# Patient Record
Sex: FEMALE | Race: WHITE
[De-identification: names, ages, dates, MRNs, and addresses within clinical notes are randomized per-mention and may not be internally consistent; named-entity substitution may affect disease eponyms.]

---

## 2019-12-30 ENCOUNTER — HOSPITAL ENCOUNTER (INPATIENT)
Dept: HOSPITAL 62 - LR | Age: 34
LOS: 2 days | Discharge: HOME | End: 2020-01-01
Attending: OBSTETRICS & GYNECOLOGY | Admitting: OBSTETRICS & GYNECOLOGY
Payer: COMMERCIAL

## 2019-12-30 DIAGNOSIS — Z3A.35: ICD-10-CM

## 2019-12-30 DIAGNOSIS — Z79.899: ICD-10-CM

## 2019-12-30 DIAGNOSIS — F17.210: ICD-10-CM

## 2019-12-30 DIAGNOSIS — F15.90: ICD-10-CM

## 2019-12-30 DIAGNOSIS — Z88.0: ICD-10-CM

## 2019-12-30 DIAGNOSIS — F12.10: ICD-10-CM

## 2019-12-30 DIAGNOSIS — A74.9: ICD-10-CM

## 2019-12-30 DIAGNOSIS — F14.10: ICD-10-CM

## 2019-12-30 LAB
ADD MANUAL DIFF: NO
APPEARANCE UR: (no result)
APTT PPP: YELLOW S
BARBITURATES UR QL SCN: NEGATIVE
BASOPHILS # BLD AUTO: 0.1 10^3/UL (ref 0–0.2)
BASOPHILS NFR BLD AUTO: 0.6 % (ref 0–2)
BILIRUB UR QL STRIP: NEGATIVE
CHLAM PCR: DETECTED
EOSINOPHIL # BLD AUTO: 0.1 10^3/UL (ref 0–0.6)
EOSINOPHIL NFR BLD AUTO: 0.5 % (ref 0–6)
ERYTHROCYTE [DISTWIDTH] IN BLOOD BY AUTOMATED COUNT: 20.7 % (ref 11.5–14)
ERYTHROCYTE [DISTWIDTH] IN BLOOD BY AUTOMATED COUNT: 21 % (ref 11.5–14)
GLUCOSE UR STRIP-MCNC: NEGATIVE MG/DL
HCT VFR BLD CALC: 28.1 % (ref 36–47)
HCT VFR BLD CALC: 28.8 % (ref 36–47)
HGB BLD-MCNC: 8.7 G/DL (ref 12–15.5)
HGB BLD-MCNC: 9 G/DL (ref 12–15.5)
KETONES UR STRIP-MCNC: NEGATIVE MG/DL
LYMPHOCYTES # BLD AUTO: 2.6 10^3/UL (ref 0.5–4.7)
LYMPHOCYTES NFR BLD AUTO: 15.3 % (ref 13–45)
MCH RBC QN AUTO: 20.7 PG (ref 27–33.4)
MCH RBC QN AUTO: 21 PG (ref 27–33.4)
MCHC RBC AUTO-ENTMCNC: 31 G/DL (ref 32–36)
MCHC RBC AUTO-ENTMCNC: 31.3 G/DL (ref 32–36)
MCV RBC AUTO: 67 FL (ref 80–97)
MCV RBC AUTO: 67 FL (ref 80–97)
METHADONE UR QL SCN: (no result)
MONOCYTES # BLD AUTO: 0.8 10^3/UL (ref 0.1–1.4)
MONOCYTES NFR BLD AUTO: 4.8 % (ref 3–13)
NEUTROPHILS # BLD AUTO: 13.6 10^3/UL (ref 1.7–8.2)
NEUTS SEG NFR BLD AUTO: 78.8 % (ref 42–78)
NITRITE UR QL STRIP: NEGATIVE
PCP UR QL SCN: NEGATIVE
PH UR STRIP: 8 [PH] (ref 5–9)
PLATELET # BLD: 423 10^3/UL (ref 150–450)
PLATELET # BLD: 467 10^3/UL (ref 150–450)
PROT UR STRIP-MCNC: 30 MG/DL
RBC # BLD AUTO: 4.2 10^6/UL (ref 3.72–5.28)
RBC # BLD AUTO: 4.3 10^6/UL (ref 3.72–5.28)
SP GR UR STRIP: 1.01
TOTAL CELLS COUNTED % (AUTO): 100 %
URINE AMPHETAMINES SCREEN: NEGATIVE
URINE BENZODIAZEPINES SCREEN: NEGATIVE
URINE COCAINE SCREEN: (no result)
URINE MARIJUANA (THC) SCREEN: (no result)
UROBILINOGEN UR-MCNC: 2 MG/DL (ref ?–2)
WBC # BLD AUTO: 17.2 10^3/UL (ref 4–10.5)
WBC # BLD AUTO: 17.2 10^3/UL (ref 4–10.5)

## 2019-12-30 PROCEDURE — 81005 URINALYSIS: CPT

## 2019-12-30 PROCEDURE — G0480 DRUG TEST DEF 1-7 CLASSES: HCPCS

## 2019-12-30 PROCEDURE — 86803 HEPATITIS C AB TEST: CPT

## 2019-12-30 PROCEDURE — 80349 CANNABINOIDS NATURAL: CPT

## 2019-12-30 PROCEDURE — 86804 HEP C AB TEST CONFIRM: CPT

## 2019-12-30 PROCEDURE — 36415 COLL VENOUS BLD VENIPUNCTURE: CPT

## 2019-12-30 PROCEDURE — 86701 HIV-1ANTIBODY: CPT

## 2019-12-30 PROCEDURE — 86901 BLOOD TYPING SEROLOGIC RH(D): CPT

## 2019-12-30 PROCEDURE — 80307 DRUG TEST PRSMV CHEM ANLYZR: CPT

## 2019-12-30 PROCEDURE — 88307 TISSUE EXAM BY PATHOLOGIST: CPT

## 2019-12-30 PROCEDURE — 87491 CHLMYD TRACH DNA AMP PROBE: CPT

## 2019-12-30 PROCEDURE — 86592 SYPHILIS TEST NON-TREP QUAL: CPT

## 2019-12-30 PROCEDURE — 86900 BLOOD TYPING SEROLOGIC ABO: CPT

## 2019-12-30 PROCEDURE — 86850 RBC ANTIBODY SCREEN: CPT

## 2019-12-30 PROCEDURE — 87591 N.GONORRHOEAE DNA AMP PROB: CPT

## 2019-12-30 PROCEDURE — 80353 DRUG SCREENING COCAINE: CPT

## 2019-12-30 PROCEDURE — 87340 HEPATITIS B SURFACE AG IA: CPT

## 2019-12-30 PROCEDURE — 85025 COMPLETE CBC W/AUTO DIFF WBC: CPT

## 2019-12-30 RX ADMIN — DOCUSATE SODIUM SCH MG: 100 CAPSULE, LIQUID FILLED ORAL at 17:29

## 2019-12-30 RX ADMIN — FERROUS SULFATE TAB 325 MG (65 MG ELEMENTAL FE) SCH MG: 325 (65 FE) TAB at 10:11

## 2019-12-30 RX ADMIN — IBUPROFEN SCH MG: 800 TABLET, FILM COATED ORAL at 05:51

## 2019-12-30 RX ADMIN — DOCUSATE SODIUM SCH MG: 100 CAPSULE, LIQUID FILLED ORAL at 10:10

## 2019-12-30 RX ADMIN — METHADONE HYDROCHLORIDE SCH MG: 10 TABLET ORAL at 10:11

## 2019-12-30 RX ADMIN — IBUPROFEN SCH MG: 800 TABLET, FILM COATED ORAL at 21:52

## 2019-12-30 RX ADMIN — FAMOTIDINE SCH MG: 20 TABLET, FILM COATED ORAL at 10:10

## 2019-12-30 RX ADMIN — IBUPROFEN SCH: 800 TABLET, FILM COATED ORAL at 04:33

## 2019-12-30 RX ADMIN — Medication SCH CAP: at 10:11

## 2019-12-30 RX ADMIN — IBUPROFEN SCH MG: 800 TABLET, FILM COATED ORAL at 14:00

## 2019-12-30 RX ADMIN — FERROUS SULFATE TAB 325 MG (65 MG ELEMENTAL FE) SCH MG: 325 (65 FE) TAB at 17:29

## 2019-12-30 RX ADMIN — SENNOSIDES, DOCUSATE SODIUM SCH EACH: 50; 8.6 TABLET, FILM COATED ORAL at 10:10

## 2019-12-30 RX ADMIN — FAMOTIDINE SCH MG: 20 TABLET, FILM COATED ORAL at 21:52

## 2019-12-30 NOTE — PDOC PROGRESS REPORT
Subjective-OB


Progress Note for:: 12/30/19 - Delivery Day, Informed pt of + GC/+Chlamydia 

infection, discussed treating w/ antibiotics today. Will draw Hep C, Hep B, HIV 

STD check. Pt up voiding, no complaints, A+, Rubella Immune. +UDS, Discharge 

planner in place, Pt desires PP BTL, did not sign her consent papers though.. 

will attempt to get them for her to sign while inpatient





Physical Exam (OB)


Vital Signs: 


                                        











Temp Pulse Resp BP Pulse Ox


 


 98.8 F   68   20   110/76   99 


 


 12/30/19 08:02  12/30/19 08:02  12/30/19 08:02  12/30/19 08:02  12/30/19 08:02








                                 Intake & Output











 12/29/19 12/30/19 12/31/19





 06:59 06:59 06:59


 


Weight  70.45 kg 














- General


General Appearance: Appears well, Alert


In distress: None





- Lochia


Lochia Amount: Small 10-25 ml


Lochia Color: Rubra/Red





- Abdomen


Description: Soft


Hernia Present: Yes


Fundal Description: Firm


Fundal Height: u/u - u/2





- Respiratory


Respiratory Status: No respiratory distress





- Abdominal


Inspection: Normal


Distension: No distension


Tenderness: Nontender





- Genitourinary


Genitourinary Note: 





voiding





- Extremities


Upper extremity: Normal inspection


Lower extremities: Normal inspection





- Neurological


Cognition: Normal


Orientation: AAOx4





- Psychological


Associated symptoms: Normal affect





- Skin


Skin Temperature: Warm


Skin Moisture: Dry





Objective-Diagnostic


Laboratory: 


                                        





                                 12/30/19 07:19 





                                        











  12/30/19 12/30/19 12/30/19





  01:28 01:28 02:37


 


WBC  17.2 H  


 


RBC  4.30  


 


Hgb  9.0 L  


 


Hct  28.8 L  


 


MCV  67 L  


 


MCH  21.0 L  


 


MCHC  31.3 L  


 


RDW  21.0 H  


 


Plt Count  467 H  


 


Seg Neutrophils %  78.8 H  


 


Urine Color    YELLOW


 


Urine Appearance    CLOUDY


 


Urine pH    8.0


 


Ur Specific Gravity    1.014


 


Urine Protein    30 H


 


Urine Glucose (UA)    NEGATIVE


 


Urine Ketones    NEGATIVE


 


Urine Blood    MODERATE H


 


Urine Nitrite    NEGATIVE


 


Ur Leukocyte Esterase    SMALL H


 


Blood Type   A POSITIVE 


 


Antibody Screen   NEGATIVE 














  12/30/19





  07:19


 


WBC  17.2 H


 


RBC  4.20


 


Hgb  8.7 L


 


Hct  28.1 L


 


MCV  67 L


 


MCH  20.7 L


 


MCHC  31.0 L


 


RDW  20.7 H


 


Plt Count  423


 


Seg Neutrophils % 


 


Urine Color 


 


Urine Appearance 


 


Urine pH 


 


Ur Specific Gravity 


 


Urine Protein 


 


Urine Glucose (UA) 


 


Urine Ketones 


 


Urine Blood 


 


Urine Nitrite 


 


Ur Leukocyte Esterase 


 


Blood Type 


 


Antibody Screen 














Assessment and Plan(PN)





- Assessment and Plan


(1) Precipitous delivery


Is this a current diagnosis for this admission?: Yes   





(2) Limited prenatal care in third trimester


Is this a current diagnosis for this admission?: Yes   





(3) Drug abuse and dependence


Is this a current diagnosis for this admission?: Yes   





(4) Chlamydia infection affecting pregnancy


Qualifiers: 


   Trimester: third trimester   Qualified Code(s): O98.813 - Other maternal 

infectious and parasitic diseases complicating pregnancy, third trimester; A74.9

- Chlamydial infection, unspecified   


Is this a current diagnosis for this admission?: Yes   





(5) Gonorrhea affecting pregnancy


Qualifiers: 


   Trimester: third trimester   Qualified Code(s): O98.213 - Gonorrhea 

complicating pregnancy, third trimester   


Is this a current diagnosis for this admission?: Yes   





(6) Anemia affecting pregnancy


Qualifiers: 


   Trimester: third trimester   Qualified Code(s): O99.013 - Anemia complicating

pregnancy, third trimester   


Is this a current diagnosis for this admission?: Yes   


Plan:: 





Will treat for +GC/ +chlamydia infection, Discharge planning order in place, 

Routine PP orders, Ambulation encouraged





- Time Spent with Patient


Time with patient: Less than 15 minutes


Medications reviewed and adjusted accordingly: Yes





- Disposition


Anticipated Discharge: Home


Within: within 48 hours

## 2019-12-30 NOTE — ADMISSION PHYSICAL
=================================================================



=================================================================

Datetime Report Generated by CPN: 2019 01:39

   

   

=================================================================

CURRENT ADMISSION

=================================================================

   

Chief Complaint:  Uterine Contractions; Suspected Ruptured Membranes

Admit Impression :  Active Labor

Admit Plan:  Admit to Unit; Initiate Labor Protocol

   

=================================================================

ALLERGIES

=================================================================

   

Medication Allergies:  No

Medication Allergies:  Penicillins/Hives (2019)

Latex:  No Latex Allergies

   

=================================================================

OBSTETRICAL HISTORY

=================================================================

   

EDC:  2020 00:00

:  6

Para:  4

Term:  3

:  1

SAB:  1

IAB:  0

Ectopic:  0

Livin

Cesareans:  0

VBACs:  0

Multiple Births:  0

   

=================================================================

***SEE PRENATAL RECORDS***

=================================================================

   

Alcohol:  No

Marijuana :  Yes

Cocaine:  Yes

Other Illicit Drugs:  No

Cigarettes:  Current Everyday Smoker. [id number]

   

=================================================================

PHYSICAL EXAM

=================================================================

   

General:  Normal

HEENT:  Normal

Neurologic:  Normal

Thyroid:  Normal

Heart:  Normal

Lungs:  Normal

Breast:  Normal

Back:  Normal

Abdomen:  Normal

Genitourinary Exam:  Normal

Extremities:  Normal

DTRs:  Normal

Pelvic Type:  Adequate

Vital Signs:  Reviewed

   

=================================================================

MEMBRANES

=================================================================

   

Pooling:  Positive

Membranes:  Ruptured

   

=================================================================

FETUS A

=================================================================

   

Monitoring:  External US; Internal Scalp Electrode

Fetal Presentation:  Vertex

Admit Comment:  Called to main lobby for precipitous delivery at front

   door. Found mother laying just inside front door, pants to knees and

   baby delivered and crying. COrd clamped and cut. Infant to nursery

   staff for care. Cord blood obtained. Placenta intact. She was placed

   on stretcher and then brought to LDR unit Placenta then delivered

   intact spontaneously.  cc. Perineum intact.Fundus firm and

   bleeding stopped. MOther stable. Female infant stable to nursery.

   

=================================================================

PLANS FOR LABOR AND DELIVERY

=================================================================

   

Labor and Delivery:  None

Pain Management:  None

Feeding Preference:  Breast

Benefit of Breast Feed Discussed:  Yes

Circumcision:  N/A

   

=================================================================

INFORMED CONSENT

=================================================================

   

Informed Consent Obtained:  Vaginal Delivery; Risks, Benefits and

   Alternatives Discussed

Signature:  Electronically signed by Kaci Haines MD on 2019 at

   01:38  with User ID: [email or web address]

:  Electronically signed by Kaci Haines MD on 2019 at 01:38 

   with User ID: [email or web address]

## 2019-12-30 NOTE — DELIVERY SUMMARY
=================================================================

Del Sum A-C

=================================================================

Datetime Report Generated by CPN: 2019 02:58

   

   

=================================================================

DELIVERY PERSONNEL

=================================================================

   

DELIVERY PERSONNEL:  [id number]

Delivery Doctor::  Kaci Haines MD

Labor and Delivery Nurse::  Dasia Cevallos RN

Scrub Tech/CNA:  Linda Ross, ST

   

=================================================================

MATERNAL INFORMATION

=================================================================

   

Delivery Anesthesia:  None

Medications After Delivery:  Pitocin Drip 20 Units/1000ml NSS

Estimated  Blood Loss (ml):  300

Maternal Complications:  Precipitous Labor (<3hrs); Other

Complication Details:  limited pnc, methadone use 

   

=================================================================

LABOR SUMMARY

=================================================================

   

EDC:  2020 00:00

No. Babies in Womb:  1

 Attempted:  No

Labor Anesthesia:  None

   

=================================================================

LABOR INFORMATION

=================================================================

   

Reason for Induction:  Not Applicable

Onset of Labor:  2019 20:00

Other Ripening Agents:  n/a

Oxytocin:  N/A

Group B Beta Strep:  unknown

Antibiotics # of Doses:  0

Antibiotics Time of Last Dose:  N/A

Name of Antibiotic Given:  N/A

Steroids Given:  None

Reason Steroids Not Administered:  Not Applicable

Other Reason Not Administered:  n/a

   

=================================================================

MEMBRANES

=================================================================

   

Membranes Rupture Method:  Spontaneous

Rupture of Membranes:  2019 12:55

Length of Rupture (hr):  -11.92

Amniotic Fluid Color:  Clear

Amniotic Fluid Amount:  None

Amniotic Fluid Odor:  None

   

=================================================================

STAGES OF LABOR

=================================================================

   

Stage 3 hr:  0

Stage 3 min:  13

Total Time in Labor hr:  -18

Total Time in Labor min:  -47

   

=================================================================

VAGINAL DELIVERY

=================================================================

   

Episiotomy:  None

Laceration Extension #1:  N/A

Laceration Repair:  Not Applicable

   

=================================================================

CSECTION DELIVERY

=================================================================

   

Primary Indication:  N/A

Secondary Indication:  N/A

CSection Incision:  N/A

   

=================================================================

BABY A INFORMATION

=================================================================

   

Infant Delivery Date/Time:  2019 01:00

Method of Delivery:  Vaginal

Born in Route :  No

:  N/A

Forceps:  N/A

Vacuum Extraction:  N/A

Shoulder Dystocia :  No

   

=================================================================

PRESENTATION/POSITION BABY A

=================================================================

   

Presentation:  Cephalic

Cephalic Presentation:  Vertex

Breech Presentation:  N/A

   

=================================================================

PLACENTA INFORMATION BABY A

=================================================================

   

Placenta Delivery Time :  2019 01:13

Placenta Method of Delivery:  Spontaneous

Placenta Status:  Delivered

   

=================================================================

APGAR SCORES BABY A

=================================================================

   

Heart Rate 1 min:  >100 bpm

Resp Effort 1 min:  Good Cry

Reflex Irritability 1 min:  Cough or Sneeze or Pulls Away

Muscle Tone 1 min:  Active Motion

Color 1 min:  Blue/Pale

Resuscitation Effort 1 min:  Tactile Stimulation

APGAR SCORE 1 MIN:  8

Heart Rate 5 min:  >100 bpm

Resp Effort 5 min:  Good Cry

Reflex Irritability 5 min:  Cough or Sneeze or Pulls Away

Muscle Tone 5 min:  Active Motion

Color 5 min:  Body Pink, Extremities Blue

Resuscitation Effort 5 min:  Tactile Stimulation

APGAR SCORE 5 MIN:  9

   

=================================================================

INFANT INFORMATION BABY A

=================================================================

   

Gestational Age at Delivery:  35.6

Gestational Status:  Late - 34- 36.6 Weeks

Infant Outcome :  Liveborn

Infant Condition :  Stable

Infant Sex:  Female

   

=================================================================

IDENTIFICATION BABY A

=================================================================

   

Infant Verification Date/Time:  2019 01:50

ID Band Number:  [id number]

Mother's Name Verified:  Yes

Infant Medical Record Number:  [id number]

RN Verifying Infant:  D Bellavance RN/J Niebuhr RN

   

=================================================================

WEIGHT/LENGTH BABY A

=================================================================

   

Infant Birthweight (gm):  2415

Infant Weight (lb):  5

Infant Weight (oz):  5

Infant Length (in):  18.25

Infant Length (cm):  46.36

   

=================================================================

CORD INFORMATION BABY A

=================================================================

   

No. Cord Vessels:  3

Nuchal Cord :  N/A

Cord Blood Taken:  Yes-For Storage (Mom's Blood type +)

Infant Suction:  None

   

=================================================================

ASSESSMENT BABY A

=================================================================

   

Skin to Skin:  No

   

=================================================================

BABY B INFORMATION

=================================================================

   

 :  N/A

## 2019-12-31 LAB — HCV AB SER IA-ACNC: 0.1 S/CO RATIO (ref 0–0.9)

## 2019-12-31 RX ADMIN — SENNOSIDES, DOCUSATE SODIUM SCH EACH: 50; 8.6 TABLET, FILM COATED ORAL at 09:21

## 2019-12-31 RX ADMIN — METHADONE HYDROCHLORIDE SCH MG: 10 TABLET ORAL at 09:23

## 2019-12-31 RX ADMIN — DOCUSATE SODIUM SCH MG: 100 CAPSULE, LIQUID FILLED ORAL at 09:20

## 2019-12-31 RX ADMIN — IBUPROFEN SCH MG: 800 TABLET, FILM COATED ORAL at 23:00

## 2019-12-31 RX ADMIN — IBUPROFEN SCH MG: 800 TABLET, FILM COATED ORAL at 13:33

## 2019-12-31 RX ADMIN — FAMOTIDINE SCH MG: 20 TABLET, FILM COATED ORAL at 23:01

## 2019-12-31 RX ADMIN — DOCUSATE SODIUM SCH MG: 100 CAPSULE, LIQUID FILLED ORAL at 17:35

## 2019-12-31 RX ADMIN — FERROUS SULFATE TAB 325 MG (65 MG ELEMENTAL FE) SCH MG: 325 (65 FE) TAB at 09:22

## 2019-12-31 RX ADMIN — FAMOTIDINE SCH MG: 20 TABLET, FILM COATED ORAL at 09:22

## 2019-12-31 RX ADMIN — FERROUS SULFATE TAB 325 MG (65 MG ELEMENTAL FE) SCH MG: 325 (65 FE) TAB at 17:35

## 2019-12-31 RX ADMIN — IBUPROFEN SCH MG: 800 TABLET, FILM COATED ORAL at 05:44

## 2019-12-31 RX ADMIN — Medication SCH CAP: at 09:21

## 2019-12-31 NOTE — PDOC PROGRESS REPORT
Subjective-OB


Progress Note for:: 12/31/19


Subjective: 


reports bleeding slowing, pain controlled with current meds, denies needs





Physical Exam (OB)


Vital Signs: 


                                        











Temp Pulse Resp BP Pulse Ox


 


 97.4 F   74   16   129/84 H  98 


 


 12/31/19 07:49  12/31/19 07:49  12/31/19 07:49  12/31/19 07:49  12/31/19 07:49








                                 Intake & Output











 12/30/19 12/31/19 01/01/20





 06:59 06:59 06:59


 


Weight 70.45 kg  














- Abdomen


Description: Soft


Hernia Present: No


Fundal Description: Firm, Midline


Fundal Height: u/u - u/2





- Abdominal


Distension: No distension


Tenderness: Nontender





- Extremities


Lower extremities: Curtis's sign - neg


Calf: Normal, Nontender





Objective-Diagnostic


Laboratory: 


                                        





                                 12/30/19 07:19 











Assessment and Plan(PN)





- Assessment and Plan


(1) Anemia affecting pregnancy


Qualifiers: 


   Trimester: third trimester   Qualified Code(s): O99.013 - Anemia complicating

pregnancy, third trimester   


Is this a current diagnosis for this admission?: Yes   





(2) Chlamydia infection affecting pregnancy


Qualifiers: 


   Trimester: third trimester   Qualified Code(s): O98.813 - Other maternal 

infectious and parasitic diseases complicating pregnancy, third trimester; A74.9

- Chlamydial infection, unspecified   


Is this a current diagnosis for this admission?: Yes   





(3) Drug abuse and dependence


Is this a current diagnosis for this admission?: Yes   





(4) Gonorrhea affecting pregnancy


Qualifiers: 


   Trimester: third trimester   Qualified Code(s): O98.213 - Gonorrhea 

complicating pregnancy, third trimester   


Is this a current diagnosis for this admission?: Yes   





(5) Limited prenatal care in third trimester


Is this a current diagnosis for this admission?: Yes   





(6) Precipitous delivery


Is this a current diagnosis for this admission?: Yes   





- Time Spent with Patient


Time with patient: Less than 15 minutes


Medications reviewed and adjusted accordingly: Yes





- Disposition


Anticipated Discharge: Home


Within: within 24 hours

## 2020-01-01 VITALS — SYSTOLIC BLOOD PRESSURE: 128 MMHG | DIASTOLIC BLOOD PRESSURE: 83 MMHG

## 2020-01-01 RX ADMIN — DOCUSATE SODIUM SCH MG: 100 CAPSULE, LIQUID FILLED ORAL at 09:21

## 2020-01-01 RX ADMIN — Medication SCH CAP: at 09:21

## 2020-01-01 RX ADMIN — SENNOSIDES, DOCUSATE SODIUM SCH EACH: 50; 8.6 TABLET, FILM COATED ORAL at 09:21

## 2020-01-01 RX ADMIN — FAMOTIDINE SCH MG: 20 TABLET, FILM COATED ORAL at 09:21

## 2020-01-01 RX ADMIN — FERROUS SULFATE TAB 325 MG (65 MG ELEMENTAL FE) SCH MG: 325 (65 FE) TAB at 09:21

## 2020-01-01 RX ADMIN — METHADONE HYDROCHLORIDE SCH MG: 10 TABLET ORAL at 09:21

## 2020-01-01 RX ADMIN — IBUPROFEN SCH MG: 800 TABLET, FILM COATED ORAL at 05:48

## 2020-01-01 NOTE — PDOC DISCHARGE SUMMARY
Impression





- Admit/DC Date/PCP


Admission Date/Primary Care Provider: 


  12/30/19 01:18





  ROSAURA TAN DO





Discharge Date: 01/01/20





- Discharge Diagnosis


(1) Anemia affecting pregnancy


Is this a current diagnosis for this admission?: Yes   





(2) Chlamydia infection affecting pregnancy


Is this a current diagnosis for this admission?: Yes   





(3) Drug abuse and dependence


Is this a current diagnosis for this admission?: Yes   





(4) Gonorrhea affecting pregnancy


Is this a current diagnosis for this admission?: Yes   





(5) Limited prenatal care in third trimester


Is this a current diagnosis for this admission?: Yes   





(6) Precipitous delivery


Is this a current diagnosis for this admission?: Yes   





- Additional Information


Discharge Diet: Regular


Discharge Activity: Balance Activity w/Rest, Pelvic Rest


Referrals: 


ROSAURA TAN DO [Primary Care Provider] - 


Prescriptions: 


Ferrous Sulfate [Albafort] 325 mg PO BID #60 tablet


Ibuprofen [Motrin 800 mg Tablet] 800 mg PO Q8HP PRN #60 tablet


 PRN Reason: 


Prenatal Vit/Dha [Prenatal Multi + Dha Capsule] 1 cap PO DAILY #90 capsule


Home Medications: 








Methadone HCl 80 mg PO 12/30/19 


Ferrous Sulfate [Albafort] 325 mg PO BID #60 tablet 01/01/20 


Ibuprofen [Motrin 800 mg Tablet] 800 mg PO Q8HP PRN #60 tablet 01/01/20 


Prenatal Vit/Dha [Prenatal Multi + Dha Capsule] 1 cap PO DAILY #90 capsule 

01/01/20 











HPI


Gestational Age: 36


Reason(s) for Admission: Onset of Labor


Prenatal Procedures: None


Intrapartum Procedure(s): Spontaneous Vaginal Delivery





Results


Laboratory Results: 


                                        











WBC  17.2 10^3/uL (4.0-10.5)  H  12/30/19  07:19    


 


RBC  4.20 10^6/uL (3.72-5.28)   12/30/19  07:19    


 


Hgb  8.7 g/dL (12.0-15.5)  L  12/30/19  07:19    


 


Hct  28.1 % (36.0-47.0)  L  12/30/19  07:19    


 


MCV  67 fl (80-97)  L  12/30/19  07:19    


 


MCH  20.7 pg (27.0-33.4)  L  12/30/19  07:19    


 


MCHC  31.0 g/dL (32.0-36.0)  L  12/30/19  07:19    


 


RDW  20.7 % (11.5-14.0)  H  12/30/19  07:19    


 


Plt Count  423 10^3/uL (150-450)   12/30/19  07:19    


 


Lymph % (Auto)  15.3 % (13-45)   12/30/19  01:28    


 


Mono % (Auto)  4.8 % (3-13)   12/30/19  01:28    


 


Eos % (Auto)  0.5 % (0-6)   12/30/19  01:28    


 


Baso % (Auto)  0.6 % (0-2)   12/30/19  01:28    


 


Absolute Neuts (auto)  13.6 10^3/uL (1.7-8.2)  H  12/30/19  01:28    


 


Absolute Lymphs (auto)  2.6 10^3/uL (0.5-4.7)   12/30/19  01:28    


 


Absolute Monos (auto)  0.8 10^3/uL (0.1-1.4)   12/30/19  01:28    


 


Absolute Eos (auto)  0.1 10^3/uL (0.0-0.6)   12/30/19  01:28    


 


Absolute Basos (auto)  0.1 10^3/uL (0.0-0.2)   12/30/19  01:28    


 


Seg Neutrophils %  78.8 % (42-78)  H  12/30/19  01:28    


 


Urine Color  YELLOW   12/30/19  02:37    


 


Urine Appearance  CLOUDY   12/30/19  02:37    


 


Urine pH  8.0  (5.0-9.0)   12/30/19  02:37    


 


Ur Specific Gravity  1.014   12/30/19  02:37    


 


Urine Protein  30 mg/dL (NEGATIVE)  H  12/30/19  02:37    


 


Urine Glucose (UA)  NEGATIVE mg/dL (NEGATIVE)   12/30/19  02:37    


 


Urine Ketones  NEGATIVE mg/dL (NEGATIVE)   12/30/19  02:37    


 


Urine Blood  MODERATE  (NEGATIVE)  H  12/30/19  02:37    


 


Urine Nitrite  NEGATIVE  (NEGATIVE)   12/30/19  02:37    


 


Urine Bilirubin  NEGATIVE  (NEGATIVE)   12/30/19  02:37    


 


Urine Urobilinogen  2.0 mg/dL (<2.0)  H  12/30/19  02:37    


 


Ur Leukocyte Esterase  SMALL  (NEGATIVE)  H  12/30/19  02:37    


 


Urine Ascorbic Acid  NEGATIVE  (NEGATIVE)   12/30/19  02:37    


 


Urine Opiates Screen  NEGATIVE   12/30/19  02:37    


 


Urine Methadone Screen  UNCONFIRMED POSITIVE   12/30/19  02:37    


 


Ur Barbiturates Screen  NEGATIVE   12/30/19  02:37    


 


Ur Phencyclidine Scrn  NEGATIVE   12/30/19  02:37    


 


Ur Amphetamines Screen  NEGATIVE   12/30/19  02:37    


 


U Benzodiazepines Scrn  NEGATIVE   12/30/19  02:37    


 


Urine Cocaine Screen  UNCONFIRMED POSITIVE   12/30/19  02:37    


 


U Marijuana (THC) Screen  UNCONFIRMED POSITIVE   12/30/19  02:37    


 


RPR  NONREACTIVE  (NONREACTIVE)   12/30/19  01:28    


 


Chlamydia DNA (PCR)  DETECTED  (NOT DETECT)  H  12/30/19  02:37    


 


Hep Bs Antigen  Negative  (Negative)   12/30/19  01:28    


 


Hepatitis C ([facility])  0.1 s/co ratio (0.0-0.9)   12/30/19  01:28    


 


Hep C Verif Com 1  Comment  (.)   12/30/19  01:28    


 


HIV 1&2 Antibody  NEGATIVE  (NEGATIVE)   12/30/19  01:28    


 


N.gonorrhoeae DNA (PCR)  DETECTED  (NOT DETECT)  H  12/30/19  02:37    


 


Blood Type  A POSITIVE   12/30/19  01:28    


 


Antibody Screen  NEGATIVE   12/30/19  01:28    














Plan


Plan of Treatment: 


follow up at [facility] in 4 weeks for post partum check